# Patient Record
Sex: FEMALE | Race: WHITE
[De-identification: names, ages, dates, MRNs, and addresses within clinical notes are randomized per-mention and may not be internally consistent; named-entity substitution may affect disease eponyms.]

---

## 2018-09-07 ENCOUNTER — HOSPITAL ENCOUNTER (OUTPATIENT)
Dept: HOSPITAL 92 - BICMAMMO | Age: 58
Discharge: HOME | End: 2018-09-07
Attending: INTERNAL MEDICINE
Payer: COMMERCIAL

## 2018-09-07 DIAGNOSIS — Z12.31: Primary | ICD-10-CM

## 2018-09-07 DIAGNOSIS — N63.10: ICD-10-CM

## 2018-09-07 DIAGNOSIS — N63.20: ICD-10-CM

## 2018-09-07 DIAGNOSIS — Z80.3: ICD-10-CM

## 2018-09-07 PROCEDURE — 77063 BREAST TOMOSYNTHESIS BI: CPT

## 2018-09-07 PROCEDURE — 77067 SCR MAMMO BI INCL CAD: CPT

## 2019-08-02 ENCOUNTER — HOSPITAL ENCOUNTER (OUTPATIENT)
Dept: HOSPITAL 92 - DTY/OP | Age: 59
Discharge: HOME | End: 2019-08-02
Attending: SURGERY
Payer: COMMERCIAL

## 2019-08-02 DIAGNOSIS — E66.01: Primary | ICD-10-CM

## 2019-08-02 PROCEDURE — 97802 MEDICAL NUTRITION INDIV IN: CPT

## 2019-09-03 ENCOUNTER — HOSPITAL ENCOUNTER (OUTPATIENT)
Dept: HOSPITAL 92 - DTY/OP | Age: 59
Discharge: HOME | End: 2019-09-03
Attending: SURGERY
Payer: COMMERCIAL

## 2019-09-03 DIAGNOSIS — E66.01: Primary | ICD-10-CM

## 2019-09-03 PROCEDURE — 97802 MEDICAL NUTRITION INDIV IN: CPT

## 2019-10-03 ENCOUNTER — HOSPITAL ENCOUNTER (OUTPATIENT)
Dept: HOSPITAL 92 - DTY/OP | Age: 59
Discharge: HOME | End: 2019-10-03
Attending: SURGERY
Payer: COMMERCIAL

## 2019-10-03 DIAGNOSIS — E66.01: Primary | ICD-10-CM

## 2019-10-03 PROCEDURE — 97802 MEDICAL NUTRITION INDIV IN: CPT

## 2019-11-05 ENCOUNTER — HOSPITAL ENCOUNTER (OUTPATIENT)
Dept: HOSPITAL 92 - DTY/OP | Age: 59
Discharge: HOME | End: 2019-11-05
Attending: SURGERY
Payer: COMMERCIAL

## 2019-11-05 DIAGNOSIS — E66.01: Primary | ICD-10-CM

## 2019-11-05 PROCEDURE — 97802 MEDICAL NUTRITION INDIV IN: CPT

## 2019-11-12 ENCOUNTER — HOSPITAL ENCOUNTER (OUTPATIENT)
Dept: HOSPITAL 92 - LABBT | Age: 59
Discharge: HOME | End: 2019-11-12
Attending: SURGERY
Payer: COMMERCIAL

## 2019-11-12 ENCOUNTER — HOSPITAL ENCOUNTER (INPATIENT)
Dept: HOSPITAL 92 - SURG A | Age: 59
LOS: 9 days | Discharge: HOME | DRG: 621 | End: 2019-11-21
Attending: SURGERY | Admitting: SURGERY
Payer: COMMERCIAL

## 2019-11-12 VITALS — BODY MASS INDEX: 37.8 KG/M2

## 2019-11-12 DIAGNOSIS — M19.91: ICD-10-CM

## 2019-11-12 DIAGNOSIS — Z90.89: ICD-10-CM

## 2019-11-12 DIAGNOSIS — Z01.818: Primary | ICD-10-CM

## 2019-11-12 DIAGNOSIS — Z96.652: ICD-10-CM

## 2019-11-12 DIAGNOSIS — E66.01: Primary | ICD-10-CM

## 2019-11-12 DIAGNOSIS — Z88.8: ICD-10-CM

## 2019-11-12 DIAGNOSIS — E66.01: ICD-10-CM

## 2019-11-12 DIAGNOSIS — Z90.710: ICD-10-CM

## 2019-11-12 DIAGNOSIS — I10: ICD-10-CM

## 2019-11-12 DIAGNOSIS — Z96.642: ICD-10-CM

## 2019-11-12 DIAGNOSIS — E78.5: ICD-10-CM

## 2019-11-12 LAB
ALBUMIN SERPL BCG-MCNC: 4.2 G/DL (ref 3.5–5)
ALP SERPL-CCNC: 61 U/L (ref 40–110)
ALT SERPL W P-5'-P-CCNC: 17 U/L (ref 8–55)
ANION GAP SERPL CALC-SCNC: 13 MMOL/L (ref 10–20)
AST SERPL-CCNC: 21 U/L (ref 5–34)
BASOPHILS # BLD AUTO: 0.1 THOU/UL (ref 0–0.2)
BASOPHILS NFR BLD AUTO: 1.1 % (ref 0–1)
BILIRUB SERPL-MCNC: 0.3 MG/DL (ref 0.2–1.2)
BUN SERPL-MCNC: 19 MG/DL (ref 9.8–20.1)
CALCIUM SERPL-MCNC: 10.2 MG/DL (ref 7.8–10.44)
CHLORIDE SERPL-SCNC: 102 MMOL/L (ref 98–107)
CO2 SERPL-SCNC: 27 MMOL/L (ref 22–29)
CREAT CL PREDICTED SERPL C-G-VRATE: 0 ML/MIN (ref 70–130)
EOSINOPHIL # BLD AUTO: 0.3 THOU/UL (ref 0–0.7)
EOSINOPHIL NFR BLD AUTO: 2.8 % (ref 0–10)
GLOBULIN SER CALC-MCNC: 3.3 G/DL (ref 2.4–3.5)
GLUCOSE SERPL-MCNC: 82 MG/DL (ref 70–105)
HGB BLD-MCNC: 13.8 G/DL (ref 12–16)
LYMPHOCYTES # BLD: 4 THOU/UL (ref 1.2–3.4)
LYMPHOCYTES NFR BLD AUTO: 38.7 % (ref 21–51)
MCH RBC QN AUTO: 31.9 PG (ref 27–31)
MCV RBC AUTO: 95 FL (ref 78–98)
MONOCYTES # BLD AUTO: 1 THOU/UL (ref 0.11–0.59)
MONOCYTES NFR BLD AUTO: 9.8 % (ref 0–10)
NEUTROPHILS # BLD AUTO: 4.9 THOU/UL (ref 1.4–6.5)
NEUTROPHILS NFR BLD AUTO: 47.7 % (ref 42–75)
PLATELET # BLD AUTO: 303 THOU/UL (ref 130–400)
POTASSIUM SERPL-SCNC: 3.6 MMOL/L (ref 3.5–5.1)
RBC # BLD AUTO: 4.32 MILL/UL (ref 4.2–5.4)
SODIUM SERPL-SCNC: 138 MMOL/L (ref 136–145)
WBC # BLD AUTO: 10.3 THOU/UL (ref 4.8–10.8)

## 2019-11-12 PROCEDURE — 85025 COMPLETE CBC W/AUTO DIFF WBC: CPT

## 2019-11-12 PROCEDURE — 74241: CPT

## 2019-11-12 PROCEDURE — 83036 HEMOGLOBIN GLYCOSYLATED A1C: CPT

## 2019-11-12 PROCEDURE — 88307 TISSUE EXAM BY PATHOLOGIST: CPT

## 2019-11-12 PROCEDURE — 94760 N-INVAS EAR/PLS OXIMETRY 1: CPT

## 2019-11-12 PROCEDURE — 88312 SPECIAL STAINS GROUP 1: CPT

## 2019-11-12 PROCEDURE — 80053 COMPREHEN METABOLIC PANEL: CPT

## 2019-11-12 PROCEDURE — C9113 INJ PANTOPRAZOLE SODIUM, VIA: HCPCS

## 2019-11-12 PROCEDURE — S0028 INJECTION, FAMOTIDINE, 20 MG: HCPCS

## 2019-11-12 PROCEDURE — 80048 BASIC METABOLIC PNL TOTAL CA: CPT

## 2019-11-12 PROCEDURE — S0020 INJECTION, BUPIVICAINE HYDRO: HCPCS

## 2019-11-12 PROCEDURE — 36415 COLL VENOUS BLD VENIPUNCTURE: CPT

## 2019-11-12 PROCEDURE — 71046 X-RAY EXAM CHEST 2 VIEWS: CPT

## 2019-11-12 NOTE — RAD
2 view chest:

[11/12/2019]



Comparion:12/29/2014

HISTORY: Preoperative patient



FINDINGS: Heart and mediastinal contours are grossly unremarkable. No pneumothorax or pleural fluid. 
No focal consolidation or alveolar edema.



IMPRESSION: No acute findings.



Reported By: Vu Garduno 

Electronically Signed:  11/12/2019 3:10 PM

## 2019-11-20 PROCEDURE — 0DQ44ZZ REPAIR ESOPHAGOGASTRIC JUNCTION, PERCUTANEOUS ENDOSCOPIC APPROACH: ICD-10-PCS | Performed by: SURGERY

## 2019-11-20 PROCEDURE — 0DB64Z3 EXCISION OF STOMACH, PERCUTANEOUS ENDOSCOPIC APPROACH, VERTICAL: ICD-10-PCS | Performed by: SURGERY

## 2019-11-20 PROCEDURE — 0DJ08ZZ INSPECTION OF UPPER INTESTINAL TRACT, VIA NATURAL OR ARTIFICIAL OPENING ENDOSCOPIC: ICD-10-PCS | Performed by: SURGERY

## 2019-11-20 RX ADMIN — POTASSIUM CHLORIDE, DEXTROSE MONOHYDRATE AND SODIUM CHLORIDE SCH MLS: 150; 5; 450 INJECTION, SOLUTION INTRAVENOUS at 15:01

## 2019-11-20 RX ADMIN — POTASSIUM CHLORIDE, DEXTROSE MONOHYDRATE AND SODIUM CHLORIDE SCH MLS: 150; 5; 450 INJECTION, SOLUTION INTRAVENOUS at 21:14

## 2019-11-20 RX ADMIN — CEFAZOLIN SODIUM SCH MLS: 2 SOLUTION INTRAVENOUS at 17:29

## 2019-11-21 VITALS — TEMPERATURE: 98.3 F | SYSTOLIC BLOOD PRESSURE: 101 MMHG | DIASTOLIC BLOOD PRESSURE: 68 MMHG

## 2019-11-21 LAB
ANION GAP SERPL CALC-SCNC: 10 MMOL/L (ref 10–20)
BASOPHILS # BLD AUTO: 0 THOU/UL (ref 0–0.2)
BASOPHILS NFR BLD AUTO: 0.2 % (ref 0–1)
BUN SERPL-MCNC: 9 MG/DL (ref 9.8–20.1)
CALCIUM SERPL-MCNC: 8.5 MG/DL (ref 7.8–10.44)
CHLORIDE SERPL-SCNC: 106 MMOL/L (ref 98–107)
CO2 SERPL-SCNC: 24 MMOL/L (ref 22–29)
CREAT CL PREDICTED SERPL C-G-VRATE: 211 ML/MIN (ref 70–130)
EOSINOPHIL # BLD AUTO: 0 THOU/UL (ref 0–0.7)
EOSINOPHIL NFR BLD AUTO: 0.3 % (ref 0–10)
GLUCOSE SERPL-MCNC: 115 MG/DL (ref 70–105)
HGB BLD-MCNC: 11.2 G/DL (ref 12–16)
LYMPHOCYTES # BLD: 2.8 THOU/UL (ref 1.2–3.4)
LYMPHOCYTES NFR BLD AUTO: 26.7 % (ref 21–51)
MCH RBC QN AUTO: 31.7 PG (ref 27–31)
MCV RBC AUTO: 94.7 FL (ref 78–98)
MONOCYTES # BLD AUTO: 0.9 THOU/UL (ref 0.11–0.59)
MONOCYTES NFR BLD AUTO: 8.5 % (ref 0–10)
NEUTROPHILS # BLD AUTO: 6.8 THOU/UL (ref 1.4–6.5)
NEUTROPHILS NFR BLD AUTO: 64.3 % (ref 42–75)
PLATELET # BLD AUTO: 257 THOU/UL (ref 130–400)
POTASSIUM SERPL-SCNC: 3.6 MMOL/L (ref 3.5–5.1)
RBC # BLD AUTO: 3.52 MILL/UL (ref 4.2–5.4)
SODIUM SERPL-SCNC: 136 MMOL/L (ref 136–145)
WBC # BLD AUTO: 10.5 THOU/UL (ref 4.8–10.8)

## 2019-11-21 RX ADMIN — CEFAZOLIN SODIUM SCH MLS: 2 SOLUTION INTRAVENOUS at 00:32

## 2019-11-21 RX ADMIN — POTASSIUM CHLORIDE, DEXTROSE MONOHYDRATE AND SODIUM CHLORIDE SCH MLS: 150; 5; 450 INJECTION, SOLUTION INTRAVENOUS at 08:59

## 2019-11-21 RX ADMIN — POTASSIUM CHLORIDE, DEXTROSE MONOHYDRATE AND SODIUM CHLORIDE SCH: 150; 5; 450 INJECTION, SOLUTION INTRAVENOUS at 05:12

## 2019-11-21 NOTE — RAD
Exam: 30 mL Gastrografin single contrast modified upper GI



HISTORY: History of gastroplasty



Exposure: 1.4 minutes. 27.815 gray per centimeter square



FINDINGS: 30 cc of Gastrografin had temporary holdup at the level of the proximal gastric cardia and 
distal esophagus. This eventually progressed to the region of the gastric body and antrum. There

was prolonged holdup of contrast in the stomach without emptying into the duodenum. The patient was h
eld upright for approximately 1 hour and a repeat KUB was performed which demonstrated eventual

emptying of contrast from the stomach.



IMPRESSION: No evidence of leak or extravasation along the gastroplasty site. There was transient hol
dup of contrast at the level of the stomach likely related to a postoperative ileus.



Reported By: Jacoby Méndez 

Electronically Signed:  11/21/2019 10:03 AM

## 2019-11-21 NOTE — OP
DATE OF PROCEDURE:  11/20/2019



PREOPERATIVE DIAGNOSIS:  Morbid obesity.



PROCEDURES PERFORMED:  

1. Laparoscopic takedown of Nissen fundoplication.

2. Sleeve gastrectomy.

3. Esophagogastroscopy.



INDICATIONS:  A 59-year-old female, morbidly obese, who has attempted multiple

weight loss programs without success.  She had a previous laparoscopic Nissen

fundoplication about 15-20 years ago. 



FINDINGS:  The hiatus was intact.  There was no recurrence of the hiatal hernia.

There was moderate adhesions.  A 38-Czech bougie was used. 



DESCRIPTION OF PROCEDURE:  After informed consent was obtained, the patient was

taken to the operating room, given general endotracheal anesthesia, and placed in

supine position.  Abdomen was prepped and draped in the usual fashion.  Local

anesthesia infiltrated subcutaneously and deep and a 12-mm incision was performed

approximately 8 inches above the xiphoid slightly to the left.  A Veress needle

inserted.  Drop test performed.  Pneumoperitoneum was created to a volume of 2 L of

carbon dioxide.  Utilizing a bladeless 12-mm trocar and 0-degree laparoscope, direct

visual entry in the abdominal cavity was performed.  Pneumoperitoneum was created to

a pressure of 15 mmHg and the patient placed in steep reverse Trendelenburg

position.  Sujit liver retractor inserted.  Left lobe of the liver retracted

superiorly.  The pylorus was identified.  A 12-mm port placed on the right beneath

it and two 12s placed left subcostal.  First of all, we approached the previous wrap

and I was able to first of all dissect using sharp dissection off the liver, then I

repositioned the liver retractor that I had extra exposure.  Then, I was able to

find where the fundus was attached anteriorly and I was able to divide those

adhesions to unwrap the fundus.  Then, the omentum was taken off the greater

curvature utilizing the LigaSure.  Short gastrics divided with LigaSure and left

crura defined with LigaSure.  The posterior fundus was dissected out.  Then, the

38-Czech bougie was inserted, directed into the stomach and into the antrum.  The

linear 60-mm green load stapler used to divide the antrum to the bougie, gold load

along the bougie, and a series of blues through the angle of His and I elected to

reinforce that upper most area where the fundoplication had been with some

interrupted [id number] of 2-0 silk suture, tied intracorporeally.

Intraoperative endoscopy was performed.  The video endoscope inserted under direct

vision and advanced into the sleeve.  The staple line inspected.  There was no

bleeding.  Staple line then tested.  There was no air leak.  Stomach decompressed.

Scope removed.  The remnant of stomach removed from the abdomen through the left

lateral port site.  The fascia closed with 0 Vicryl suture and the GraNee needle.

Trocars and retractors were removed after hemostasis assured.  Skin closed with

interrupted 4-0 Rapide.  Dermabond applied.  The patient tolerated the procedure

well and transferred to Recovery in good condition.  Sponge and needle count

verified correct x2. 







Job ID:  [id number]

## 2019-12-04 ENCOUNTER — HOSPITAL ENCOUNTER (OUTPATIENT)
Dept: HOSPITAL 92 - ONC/OP | Age: 59
Discharge: HOME | End: 2019-12-04
Attending: SURGERY
Payer: COMMERCIAL

## 2019-12-04 DIAGNOSIS — Z88.1: ICD-10-CM

## 2019-12-04 DIAGNOSIS — E86.0: Primary | ICD-10-CM

## 2019-12-04 DIAGNOSIS — Z88.5: ICD-10-CM

## 2019-12-04 PROCEDURE — 96366 THER/PROPH/DIAG IV INF ADDON: CPT

## 2019-12-04 PROCEDURE — 96365 THER/PROPH/DIAG IV INF INIT: CPT

## 2019-12-04 PROCEDURE — 96361 HYDRATE IV INFUSION ADD-ON: CPT

## 2019-12-04 PROCEDURE — 96375 TX/PRO/DX INJ NEW DRUG ADDON: CPT

## 2019-12-09 ENCOUNTER — HOSPITAL ENCOUNTER (EMERGENCY)
Dept: HOSPITAL 92 - ERS | Age: 59
Discharge: HOME | End: 2019-12-09
Payer: COMMERCIAL

## 2019-12-09 DIAGNOSIS — E78.5: ICD-10-CM

## 2019-12-09 DIAGNOSIS — Z87.891: ICD-10-CM

## 2019-12-09 DIAGNOSIS — E78.00: ICD-10-CM

## 2019-12-09 DIAGNOSIS — I81: Primary | ICD-10-CM

## 2019-12-09 LAB
ALBUMIN SERPL BCG-MCNC: 3.6 G/DL (ref 3.5–5)
ALP SERPL-CCNC: 73 U/L (ref 40–110)
ALT SERPL W P-5'-P-CCNC: 22 U/L (ref 8–55)
ANION GAP SERPL CALC-SCNC: 16 MMOL/L (ref 10–20)
AST SERPL-CCNC: 25 U/L (ref 5–34)
BASOPHILS # BLD AUTO: 0.1 THOU/UL (ref 0–0.2)
BASOPHILS NFR BLD AUTO: 0.5 % (ref 0–1)
BILIRUB SERPL-MCNC: 0.5 MG/DL (ref 0.2–1.2)
BUN SERPL-MCNC: 10 MG/DL (ref 9.8–20.1)
CALCIUM SERPL-MCNC: 10.1 MG/DL (ref 7.8–10.44)
CHLORIDE SERPL-SCNC: 103 MMOL/L (ref 98–107)
CO2 SERPL-SCNC: 24 MMOL/L (ref 22–29)
CREAT CL PREDICTED SERPL C-G-VRATE: 0 ML/MIN (ref 70–130)
EOSINOPHIL # BLD AUTO: 0.2 THOU/UL (ref 0–0.7)
EOSINOPHIL NFR BLD AUTO: 1.9 % (ref 0–10)
GLOBULIN SER CALC-MCNC: 4.3 G/DL (ref 2.4–3.5)
GLUCOSE SERPL-MCNC: 101 MG/DL (ref 70–105)
HGB BLD-MCNC: 12.6 G/DL (ref 12–16)
LYMPHOCYTES # BLD: 2.5 THOU/UL (ref 1.2–3.4)
LYMPHOCYTES NFR BLD AUTO: 22.9 % (ref 21–51)
MCH RBC QN AUTO: 30.5 PG (ref 27–31)
MCV RBC AUTO: 94.3 FL (ref 78–98)
MONOCYTES # BLD AUTO: 1.1 THOU/UL (ref 0.11–0.59)
MONOCYTES NFR BLD AUTO: 9.7 % (ref 0–10)
NEUTROPHILS # BLD AUTO: 7.2 THOU/UL (ref 1.4–6.5)
NEUTROPHILS NFR BLD AUTO: 65 % (ref 42–75)
PLATELET # BLD AUTO: 431 THOU/UL (ref 130–400)
POTASSIUM SERPL-SCNC: 3.8 MMOL/L (ref 3.5–5.1)
RBC # BLD AUTO: 4.14 MILL/UL (ref 4.2–5.4)
SODIUM SERPL-SCNC: 139 MMOL/L (ref 136–145)
WBC # BLD AUTO: 11.1 THOU/UL (ref 4.8–10.8)

## 2019-12-09 PROCEDURE — 96375 TX/PRO/DX INJ NEW DRUG ADDON: CPT

## 2019-12-09 PROCEDURE — 74177 CT ABD & PELVIS W/CONTRAST: CPT

## 2019-12-09 PROCEDURE — 80053 COMPREHEN METABOLIC PANEL: CPT

## 2019-12-09 PROCEDURE — 85025 COMPLETE CBC W/AUTO DIFF WBC: CPT

## 2019-12-09 PROCEDURE — 36415 COLL VENOUS BLD VENIPUNCTURE: CPT

## 2019-12-09 PROCEDURE — 96372 THER/PROPH/DIAG INJ SC/IM: CPT

## 2019-12-09 PROCEDURE — 96365 THER/PROPH/DIAG IV INF INIT: CPT

## 2019-12-09 PROCEDURE — 83690 ASSAY OF LIPASE: CPT

## 2019-12-09 PROCEDURE — 96361 HYDRATE IV INFUSION ADD-ON: CPT

## 2019-12-09 NOTE — CT
CT abdomen and pelvis with IV and oral contrast



HISTORY: Abdominal pain. Recent gastric sleeve bariatric procedure.



FINDINGS: Small amount of oral contrast is apparent within the distal esophagus. Postoperative change
s of the stomach consistent with recent gastric sleeve procedure. No evidence of obstruction or

leak.



Solid organs of the abdomen are intact. No free air or free fluid.



Low density defect is present within the common portal vein and descends into the superior mesenteric
 vein and splenic vein.



There is stranding of the mesenteric fat centrally with reactive appearing lymph nodes in the central
 mesentery. No focal fluid or air collections. No evidence of bowel obstruction or inflammation.

Pancreas is not inflamed.



Degenerative changes of the lumbar spine. Postoperative changes of the hips. Metallic hip prostheses 
predominately obscure the pelvis, including urinary bladder.











IMPRESSION: Portal venous thrombosis.



Postoperative changes of the stomach without evidence of complication.



Reported By: BROCK Guevara 

Electronically Signed:  12/9/2019 10:28 AM

## 2019-12-26 ENCOUNTER — HOSPITAL ENCOUNTER (OUTPATIENT)
Dept: HOSPITAL 92 - BICMAMMO | Age: 59
Discharge: HOME | End: 2019-12-26
Attending: INTERNAL MEDICINE
Payer: COMMERCIAL

## 2019-12-26 DIAGNOSIS — Z12.31: Primary | ICD-10-CM

## 2019-12-26 PROCEDURE — 77063 BREAST TOMOSYNTHESIS BI: CPT

## 2019-12-26 PROCEDURE — 77067 SCR MAMMO BI INCL CAD: CPT

## 2019-12-26 NOTE — MMO
Bilateral MAMMO Bilat Screen [facility].

 

CLINICAL HISTORY:

Patient is 59 years old and is seen for screening. The patient has no family

history of breast cancer.  The patient has no personal history of cancer.

 

VIEWS:

The views performed were:  bilateral craniocaudal with tomosynthesis and

bilateral mediolateral oblique with tomosynthesis.

 

FILMS COMPARED:

The present examination has been compared to a prior imaging study performed at

[facility] on 09/07/2018.

 

This study has been interpreted with the assistance of computer-aided detection.

 

MAMMOGRAM FINDINGS:

There are scattered fibroglandular densities.

 

There are no suspicious masses, suspicious calcifications, or new areas of

architectural distortion.

 

IMPRESSION:

THERE IS NO MAMMOGRAPHIC EVIDENCE OF MALIGNANCY.

 

A ROUTINE FOLLOW-UP MAMMOGRAM IN 1 YEAR IS RECOMMENDED.

 

THE RESULTS OF THIS EXAM WERE SENT TO THE PATIENT.

 

ACR BI-RADS Category 1 - Negative

 

MAMMOGRAPHY NOTE:

 1. A negative mammogram report should not delay a biopsy if a dominant of

 clinically suspicious mass is present.

 2. Approximately 10% to 15% of breast cancers are not detected by

 mammography.

 3. Adenosis and dense breasts may obscure an underlying neoplasm.

 

 

Reported by: JEAN-CLAUDE SUAZO MD

Electonically Signed: [id number]

## 2020-12-28 ENCOUNTER — HOSPITAL ENCOUNTER (OUTPATIENT)
Dept: HOSPITAL 92 - BICMAMMO | Age: 60
Discharge: HOME | End: 2020-12-28
Attending: INTERNAL MEDICINE
Payer: COMMERCIAL

## 2020-12-28 DIAGNOSIS — Z80.3: ICD-10-CM

## 2020-12-28 DIAGNOSIS — Z12.31: Primary | ICD-10-CM

## 2020-12-28 PROCEDURE — 77067 SCR MAMMO BI INCL CAD: CPT

## 2020-12-28 PROCEDURE — 77063 BREAST TOMOSYNTHESIS BI: CPT

## 2020-12-28 NOTE — MMO
Bilateral MAMMO Bilat Screen [facility].

 

CLINICAL HISTORY:

Patient is 60 years old and is seen for screening. The patient has the following

family history of breast cancer:  maternal aunt, malignant (generic).  The

patient has no personal history of cancer. The patient has a history of right



 

VIEWS:

The views performed were:  bilateral craniocaudal with tomosynthesis and

bilateral mediolateral oblique with tomosynthesis.

 

FILMS COMPARED:

The present examination has been compared to prior imaging studies performed at

[facility] on 09/07/2018 and 12/26/2019.

 

This study has been interpreted with the assistance of computer-aided detection.

 

MAMMOGRAM FINDINGS:

There are scattered fibroglandular densities.

 

There are no suspicious masses, suspicious calcifications, or new areas of

architectural distortion.

 

IMPRESSION:

THERE IS NO MAMMOGRAPHIC EVIDENCE OF MALIGNANCY.

 

A ROUTINE FOLLOW-UP MAMMOGRAM IN 1 YEAR IS RECOMMENDED.

 

THE RESULTS OF THIS EXAM WERE SENT TO THE PATIENT.

 

ACR BI-RADS Category 1 - Negative

 

MAMMOGRAPHY NOTE:

 1. A negative mammogram report should not delay a biopsy if a dominant of

 clinically suspicious mass is present.

 2. Approximately 10% to 15% of breast cancers are not detected by

 mammography.

 3. Adenosis and dense breasts may obscure an underlying neoplasm.

 

 

Reported by: SATHISH VILLARREAL MD

Electonically Signed: [id number]

## 2021-02-15 ENCOUNTER — HOSPITAL ENCOUNTER (EMERGENCY)
Dept: HOSPITAL 92 - ERS | Age: 61
End: 2021-02-15
Payer: COMMERCIAL

## 2021-02-15 DIAGNOSIS — Z87.891: ICD-10-CM

## 2021-02-15 DIAGNOSIS — S93.402A: ICD-10-CM

## 2021-02-15 DIAGNOSIS — S82.001A: Primary | ICD-10-CM
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
## 2021-02-15 DIAGNOSIS — Z79.899: ICD-10-CM

## 2021-02-15 DIAGNOSIS — E78.5: ICD-10-CM

## 2021-02-15 NOTE — CT
CT RIGHT KNEE WITHOUT CONTRAST:

 

HISTORY:

Pain after a fall.

 

COMPARISON:

None.

 

FINDINGS:

There is a large joint effusion.

 

There is a sagittally oriented nondisplaced fracture of the lateral patella, adjacent to an ossificat
ion of the lateral patellar facet of the lateral retinaculum.

 

Large tricompartment osteophytes with a few central articular surface osteophytes of all three compar
tments.

 

The tibial spines are intact.

 

The distal femur, proximal tibia and proximal fibula are without a displaced fracture. Muscle bulk is
 maintained.

 

IMPRESSION:

Nondisplaced sagittally oriented fracture without patellar facet adjacent to a large osteophyte and o
ssification of the lateral retinaculum. This is best seen on axial images 55-61.

 

POS: HOME

## 2021-02-15 NOTE — RAD
RIGHT KNEE FOUR VIEWS:

 

History: Fall

 

Comparison: None

 

FINDINGS: 

There is a large likely hemarthrosis. No acute displaced fracture is appreciated. 

 

Large patellofemoral compartment osteophytes. The body within the suprapatellar recess. 

 

IMPRESSION: 

1. Large likely hemarthrosis, likely sequelae of a nondisplaced impaction type fracture. No displaced
 fracture appreciated. 

2. Severe patellofemoral moderate medial and lateral compartment degenerative change.

 

POS: HOME

## 2021-02-15 NOTE — RAD
LEFT ANKLE THREE VIEWS:

 

History: Fall with injury and pain

 

FINDINGS: 

Mild soft tissue swelling at the ankle. There are degenerative changes at the ankle. No acute fractur
e identified. Prominent enthesophyte from a plantar calcaneus and degenerative changes visualized at 
the intertarsal joints. 

 

IMPRESSION: 

No acute fracture identified. 

 

POS: [facility]

## 2022-05-06 ENCOUNTER — HOSPITAL ENCOUNTER (INPATIENT)
Dept: HOSPITAL 92 - SURG A | Age: 62
LOS: 11 days | Discharge: HOME | DRG: 468 | End: 2022-05-17
Attending: ORTHOPAEDIC SURGERY | Admitting: ORTHOPAEDIC SURGERY
Payer: COMMERCIAL

## 2022-05-06 ENCOUNTER — HOSPITAL ENCOUNTER (OUTPATIENT)
Dept: HOSPITAL 92 - LABBT | Age: 62
Discharge: HOME | End: 2022-05-06
Attending: ORTHOPAEDIC SURGERY
Payer: COMMERCIAL

## 2022-05-06 VITALS — BODY MASS INDEX: 28.2 KG/M2

## 2022-05-06 DIAGNOSIS — Z01.812: Primary | ICD-10-CM

## 2022-05-06 DIAGNOSIS — Z87.891: ICD-10-CM

## 2022-05-06 DIAGNOSIS — E78.00: ICD-10-CM

## 2022-05-06 DIAGNOSIS — Z23: ICD-10-CM

## 2022-05-06 DIAGNOSIS — Z20.822: ICD-10-CM
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
## 2022-05-06 DIAGNOSIS — E78.5: ICD-10-CM

## 2022-05-06 DIAGNOSIS — Z88.5: ICD-10-CM

## 2022-05-06 DIAGNOSIS — Z79.899: ICD-10-CM

## 2022-05-06 DIAGNOSIS — M19.90: ICD-10-CM

## 2022-05-06 DIAGNOSIS — T84.53XA: Primary | ICD-10-CM

## 2022-05-06 DIAGNOSIS — Z98.84: ICD-10-CM

## 2022-05-06 DIAGNOSIS — Z88.1: ICD-10-CM

## 2022-05-06 DIAGNOSIS — T84.53XA: ICD-10-CM

## 2022-05-06 LAB
ANION GAP SERPL CALC-SCNC: 15 MMOL/L (ref 10–20)
BASOPHILS # BLD AUTO: 0.1 10X3/UL (ref 0–0.2)
BASOPHILS NFR BLD AUTO: 0.7 % (ref 0–2)
BUN SERPL-MCNC: 13 MG/DL (ref 9.8–20.1)
CALCIUM SERPL-MCNC: 9 MG/DL (ref 7.8–10.44)
CHLORIDE SERPL-SCNC: 104 MMOL/L (ref 98–107)
CO2 SERPL-SCNC: 23 MMOL/L (ref 22–29)
CREAT CL PREDICTED SERPL C-G-VRATE: 0 ML/MIN (ref 70–130)
CRP SERPL-MCNC: 10.04 MG/DL
EOSINOPHIL # BLD AUTO: 0.1 10X3/UL (ref 0–0.5)
EOSINOPHIL NFR BLD AUTO: 1.7 % (ref 0–6)
GLUCOSE SERPL-MCNC: 91 MG/DL (ref 70–105)
HGB BLD-MCNC: 11.1 G/DL (ref 12–15.5)
LYMPHOCYTES NFR BLD AUTO: 39.9 % (ref 18–47)
MCH RBC QN AUTO: 28 PG (ref 27–33)
MCV RBC AUTO: 89.6 FL (ref 81.6–98.3)
MONOCYTES # BLD AUTO: 0.6 10X3/UL (ref 0–1.1)
MONOCYTES NFR BLD AUTO: 8.9 % (ref 0–10)
NEUTROPHILS # BLD AUTO: 3.5 10X3/UL (ref 1.5–8.4)
NEUTROPHILS NFR BLD AUTO: 48.5 % (ref 40–75)
PLATELET # BLD AUTO: 457 10X3/UL (ref 150–450)
POTASSIUM SERPL-SCNC: 4.5 MMOL/L (ref 3.5–5.1)
RBC # BLD AUTO: 3.96 10X6/UL (ref 3.9–5.03)
SODIUM SERPL-SCNC: 137 MMOL/L (ref 136–145)
WBC # BLD AUTO: 7.2 10X3/UL (ref 3.5–10.5)

## 2022-05-06 PROCEDURE — 80048 BASIC METABOLIC PNL TOTAL CA: CPT

## 2022-05-06 PROCEDURE — 87205 SMEAR GRAM STAIN: CPT

## 2022-05-06 PROCEDURE — 85025 COMPLETE CBC W/AUTO DIFF WBC: CPT

## 2022-05-06 PROCEDURE — 86140 C-REACTIVE PROTEIN: CPT

## 2022-05-06 PROCEDURE — 36415 COLL VENOUS BLD VENIPUNCTURE: CPT

## 2022-05-06 PROCEDURE — 85027 COMPLETE CBC AUTOMATED: CPT

## 2022-05-06 PROCEDURE — C1751 CATH, INF, PER/CENT/MIDLINE: HCPCS

## 2022-05-06 PROCEDURE — C1713 ANCHOR/SCREW BN/BN,TIS/BN: HCPCS

## 2022-05-06 PROCEDURE — U0003 INFECTIOUS AGENT DETECTION BY NUCLEIC ACID (DNA OR RNA); SEVERE ACUTE RESPIRATORY SYNDROME CORONAVIRUS 2 (SARS-COV-2) (CORONAVIRUS DISEASE [COVID-19]), AMPLIFIED PROBE TECHNIQUE, MAKING USE OF HIGH THROUGHPUT TECHNOLOGIES AS DESCRIBED BY CMS-2020-01-R: HCPCS

## 2022-05-06 PROCEDURE — U0005 INFEC AGEN DETEC AMPLI PROBE: HCPCS

## 2022-05-06 PROCEDURE — 36569 INSJ PICC 5 YR+ W/O IMAGING: CPT

## 2022-05-06 PROCEDURE — 82550 ASSAY OF CK (CPK): CPT

## 2022-05-06 PROCEDURE — 87070 CULTURE OTHR SPECIMN AEROBIC: CPT

## 2022-05-06 PROCEDURE — 88305 TISSUE EXAM BY PATHOLOGIST: CPT

## 2022-05-06 PROCEDURE — S0020 INJECTION, BUPIVICAINE HYDRO: HCPCS

## 2022-05-10 PROCEDURE — 0SRC0J9 REPLACEMENT OF RIGHT KNEE JOINT WITH SYNTHETIC SUBSTITUTE, CEMENTED, OPEN APPROACH: ICD-10-PCS | Performed by: ORTHOPAEDIC SURGERY

## 2022-05-10 PROCEDURE — 0SPC0JZ REMOVAL OF SYNTHETIC SUBSTITUTE FROM RIGHT KNEE JOINT, OPEN APPROACH: ICD-10-PCS | Performed by: ORTHOPAEDIC SURGERY

## 2022-05-10 RX ADMIN — DAPTOMYCIN SCH MLS: 500 INJECTION, POWDER, LYOPHILIZED, FOR SOLUTION INTRAVENOUS at 18:38

## 2022-05-10 RX ADMIN — DOCUSATE SODIUM 50 MG AND SENNOSIDES 8.6 MG SCH: 8.6; 5 TABLET, FILM COATED ORAL at 21:02

## 2022-05-10 RX ADMIN — ASPIRIN SCH MG: 81 TABLET ORAL at 21:01

## 2022-05-11 LAB
HGB BLD-MCNC: 10.9 G/DL (ref 12–16)
MCH RBC QN AUTO: 29.4 PG (ref 27–31)
MCV RBC AUTO: 92 FL (ref 78–98)
PLATELET # BLD AUTO: 437 THOU/UL (ref 130–400)
RBC # BLD AUTO: 3.72 MILL/UL (ref 4.2–5.4)
WBC # BLD AUTO: 9.1 THOU/UL (ref 4.8–10.8)

## 2022-05-11 PROCEDURE — 02HV33Z INSERTION OF INFUSION DEVICE INTO SUPERIOR VENA CAVA, PERCUTANEOUS APPROACH: ICD-10-PCS

## 2022-05-11 PROCEDURE — B548ZZA ULTRASONOGRAPHY OF SUPERIOR VENA CAVA, GUIDANCE: ICD-10-PCS

## 2022-05-11 RX ADMIN — CALCIUM CARBONATE SCH MG: 500 TABLET, CHEWABLE ORAL at 08:04

## 2022-05-11 RX ADMIN — ASPIRIN SCH: 81 TABLET ORAL at 13:53

## 2022-05-11 RX ADMIN — DOCUSATE SODIUM 50 MG AND SENNOSIDES 8.6 MG SCH TAB: 8.6; 5 TABLET, FILM COATED ORAL at 08:04

## 2022-05-11 RX ADMIN — THERA TABS SCH TAB: TAB at 21:18

## 2022-05-11 RX ADMIN — THERA TABS SCH TAB: TAB at 08:04

## 2022-05-11 RX ADMIN — DAPTOMYCIN SCH MLS: 500 INJECTION, POWDER, LYOPHILIZED, FOR SOLUTION INTRAVENOUS at 17:50

## 2022-05-11 RX ADMIN — DOCUSATE SODIUM 50 MG AND SENNOSIDES 8.6 MG SCH TAB: 8.6; 5 TABLET, FILM COATED ORAL at 21:18

## 2022-05-11 RX ADMIN — ASPIRIN SCH MG: 81 TABLET ORAL at 21:17

## 2022-05-12 RX ADMIN — DAPTOMYCIN SCH MLS: 500 INJECTION, POWDER, LYOPHILIZED, FOR SOLUTION INTRAVENOUS at 17:51

## 2022-05-12 RX ADMIN — CALCIUM CARBONATE SCH: 500 TABLET, CHEWABLE ORAL at 09:38

## 2022-05-12 RX ADMIN — DOCUSATE SODIUM 50 MG AND SENNOSIDES 8.6 MG SCH TAB: 8.6; 5 TABLET, FILM COATED ORAL at 09:34

## 2022-05-12 RX ADMIN — ASPIRIN SCH MG: 81 TABLET ORAL at 20:10

## 2022-05-12 RX ADMIN — THERA TABS SCH TAB: TAB at 20:11

## 2022-05-12 RX ADMIN — ASPIRIN SCH MG: 81 TABLET ORAL at 09:35

## 2022-05-12 RX ADMIN — THERA TABS SCH TAB: TAB at 09:35

## 2022-05-12 RX ADMIN — DOCUSATE SODIUM 50 MG AND SENNOSIDES 8.6 MG SCH TAB: 8.6; 5 TABLET, FILM COATED ORAL at 20:11

## 2022-05-13 RX ADMIN — DOCUSATE SODIUM 50 MG AND SENNOSIDES 8.6 MG SCH: 8.6; 5 TABLET, FILM COATED ORAL at 21:38

## 2022-05-13 RX ADMIN — DAPTOMYCIN SCH MLS: 500 INJECTION, POWDER, LYOPHILIZED, FOR SOLUTION INTRAVENOUS at 17:50

## 2022-05-13 RX ADMIN — DOCUSATE SODIUM 50 MG AND SENNOSIDES 8.6 MG SCH TAB: 8.6; 5 TABLET, FILM COATED ORAL at 08:41

## 2022-05-13 RX ADMIN — THERA TABS SCH TAB: TAB at 08:42

## 2022-05-13 RX ADMIN — THERA TABS SCH TAB: TAB at 21:25

## 2022-05-13 RX ADMIN — CALCIUM CARBONATE SCH: 500 TABLET, CHEWABLE ORAL at 08:42

## 2022-05-13 RX ADMIN — ASPIRIN SCH MG: 81 TABLET ORAL at 21:25

## 2022-05-13 RX ADMIN — ASPIRIN SCH MG: 81 TABLET ORAL at 08:41

## 2022-05-14 RX ADMIN — ASPIRIN SCH MG: 81 TABLET ORAL at 20:46

## 2022-05-14 RX ADMIN — HYDROCODONE BITARTRATE AND ACETAMINOPHEN PRN TAB: 10; 325 TABLET ORAL at 20:47

## 2022-05-14 RX ADMIN — THERA TABS SCH TAB: TAB at 08:02

## 2022-05-14 RX ADMIN — THERA TABS SCH TAB: TAB at 20:48

## 2022-05-14 RX ADMIN — CALCIUM CARBONATE SCH: 500 TABLET, CHEWABLE ORAL at 08:02

## 2022-05-14 RX ADMIN — ASPIRIN SCH MG: 81 TABLET ORAL at 08:02

## 2022-05-14 RX ADMIN — DOCUSATE SODIUM 50 MG AND SENNOSIDES 8.6 MG SCH TAB: 8.6; 5 TABLET, FILM COATED ORAL at 08:02

## 2022-05-14 RX ADMIN — DAPTOMYCIN SCH MLS: 500 INJECTION, POWDER, LYOPHILIZED, FOR SOLUTION INTRAVENOUS at 18:07

## 2022-05-14 RX ADMIN — DOCUSATE SODIUM 50 MG AND SENNOSIDES 8.6 MG SCH: 8.6; 5 TABLET, FILM COATED ORAL at 20:48

## 2022-05-15 RX ADMIN — THERA TABS SCH TAB: TAB at 21:07

## 2022-05-15 RX ADMIN — HYDROCODONE BITARTRATE AND ACETAMINOPHEN PRN TAB: 10; 325 TABLET ORAL at 21:07

## 2022-05-15 RX ADMIN — DOCUSATE SODIUM 50 MG AND SENNOSIDES 8.6 MG SCH: 8.6; 5 TABLET, FILM COATED ORAL at 08:20

## 2022-05-15 RX ADMIN — CALCIUM CARBONATE SCH: 500 TABLET, CHEWABLE ORAL at 08:19

## 2022-05-15 RX ADMIN — ASPIRIN SCH MG: 81 TABLET ORAL at 21:06

## 2022-05-15 RX ADMIN — ASPIRIN SCH MG: 81 TABLET ORAL at 08:19

## 2022-05-15 RX ADMIN — DAPTOMYCIN SCH MLS: 500 INJECTION, POWDER, LYOPHILIZED, FOR SOLUTION INTRAVENOUS at 17:55

## 2022-05-15 RX ADMIN — DOCUSATE SODIUM 50 MG AND SENNOSIDES 8.6 MG SCH: 8.6; 5 TABLET, FILM COATED ORAL at 21:07

## 2022-05-15 RX ADMIN — THERA TABS SCH TAB: TAB at 08:19

## 2022-05-16 RX ADMIN — THERA TABS SCH TAB: TAB at 21:07

## 2022-05-16 RX ADMIN — ASPIRIN SCH MG: 81 TABLET ORAL at 21:07

## 2022-05-16 RX ADMIN — DOCUSATE SODIUM 50 MG AND SENNOSIDES 8.6 MG SCH: 8.6; 5 TABLET, FILM COATED ORAL at 21:08

## 2022-05-16 RX ADMIN — DAPTOMYCIN SCH MLS: 500 INJECTION, POWDER, LYOPHILIZED, FOR SOLUTION INTRAVENOUS at 19:30

## 2022-05-16 RX ADMIN — HYDROCODONE BITARTRATE AND ACETAMINOPHEN PRN TAB: 10; 325 TABLET ORAL at 17:23

## 2022-05-16 RX ADMIN — DOCUSATE SODIUM 50 MG AND SENNOSIDES 8.6 MG SCH TAB: 8.6; 5 TABLET, FILM COATED ORAL at 10:03

## 2022-05-16 RX ADMIN — HYDROCODONE BITARTRATE AND ACETAMINOPHEN PRN TAB: 10; 325 TABLET ORAL at 21:08

## 2022-05-16 RX ADMIN — THERA TABS SCH TAB: TAB at 10:04

## 2022-05-16 RX ADMIN — CALCIUM CARBONATE SCH: 500 TABLET, CHEWABLE ORAL at 10:02

## 2022-05-16 RX ADMIN — ASPIRIN SCH MG: 81 TABLET ORAL at 10:04

## 2022-05-17 VITALS — TEMPERATURE: 98 F | DIASTOLIC BLOOD PRESSURE: 86 MMHG | SYSTOLIC BLOOD PRESSURE: 128 MMHG

## 2022-05-17 RX ADMIN — THERA TABS SCH TAB: TAB at 07:53

## 2022-05-17 RX ADMIN — CALCIUM CARBONATE SCH: 500 TABLET, CHEWABLE ORAL at 07:58

## 2022-05-17 RX ADMIN — ASPIRIN SCH MG: 81 TABLET ORAL at 07:53

## 2022-05-17 RX ADMIN — DOCUSATE SODIUM 50 MG AND SENNOSIDES 8.6 MG SCH: 8.6; 5 TABLET, FILM COATED ORAL at 07:58

## 2023-01-11 ENCOUNTER — HOSPITAL ENCOUNTER (OUTPATIENT)
Dept: HOSPITAL 92 - BICRAD | Age: 63
Discharge: HOME | End: 2023-01-11
Attending: NURSE PRACTITIONER
Payer: COMMERCIAL

## 2023-01-11 DIAGNOSIS — M25.50: Primary | ICD-10-CM

## 2023-06-20 ENCOUNTER — HOSPITAL ENCOUNTER (OUTPATIENT)
Dept: HOSPITAL 92 - SCSMRI | Age: 63
Discharge: HOME | End: 2023-06-20
Attending: ORTHOPAEDIC SURGERY
Payer: COMMERCIAL

## 2023-06-20 DIAGNOSIS — S43.431A: ICD-10-CM

## 2023-06-20 DIAGNOSIS — M19.011: ICD-10-CM

## 2023-06-20 DIAGNOSIS — S46.011A: Primary | ICD-10-CM

## 2023-06-20 DIAGNOSIS — M75.21: ICD-10-CM

## 2023-08-07 ENCOUNTER — HOSPITAL ENCOUNTER (OUTPATIENT)
Dept: HOSPITAL 92 - LABBT | Age: 63
Discharge: HOME | End: 2023-08-07
Attending: ORTHOPAEDIC SURGERY
Payer: COMMERCIAL

## 2023-08-07 DIAGNOSIS — M19.011: ICD-10-CM

## 2023-08-07 DIAGNOSIS — Z01.818: Primary | ICD-10-CM

## 2023-08-07 LAB
ANION GAP SERPL CALC-SCNC: 16 MMOL/L (ref 10–20)
BASOPHILS # BLD AUTO: 0 10X3/UL (ref 0–0.2)
BASOPHILS NFR BLD AUTO: 0.6 % (ref 0–2)
BUN SERPL-MCNC: 17 MG/DL (ref 9.8–20.1)
CALCIUM SERPL-MCNC: 9.7 MG/DL (ref 7.8–10.44)
CHLORIDE SERPL-SCNC: 109 MMOL/L (ref 98–107)
CO2 SERPL-SCNC: 19 MMOL/L (ref 23–31)
CREAT CL PREDICTED SERPL C-G-VRATE: 0 ML/MIN (ref 70–130)
EOSINOPHIL # BLD AUTO: 0.2 10X3/UL (ref 0–0.5)
EOSINOPHIL NFR BLD AUTO: 2.2 % (ref 0–6)
GLUCOSE SERPL-MCNC: 96 MG/DL (ref 80–115)
HCT VFR BLD CALC: 40.9 % (ref 34.9–44.5)
HGB BLD-MCNC: 13.6 G/DL (ref 12–15.5)
LYMPHOCYTES NFR BLD AUTO: 41 % (ref 18–47)
MCH RBC QN AUTO: 31.6 PG (ref 27–33)
MCV RBC AUTO: 95.1 FL (ref 81.6–98.3)
MONOCYTES # BLD AUTO: 0.6 10X3/UL (ref 0–1.1)
MONOCYTES NFR BLD AUTO: 8.8 % (ref 0–10)
NEUTROPHILS # BLD AUTO: 3.4 10X3/UL (ref 1.5–8.4)
NEUTROPHILS NFR BLD AUTO: 47.1 % (ref 40–75)
PLATELET # BLD AUTO: 318 10X3/UL (ref 150–450)
POTASSIUM SERPL-SCNC: 4.4 MMOL/L (ref 3.5–5.1)
RBC # BLD AUTO: 4.3 10X6/UL (ref 3.9–5.03)
SODIUM SERPL-SCNC: 140 MMOL/L (ref 136–145)
WBC # BLD AUTO: 7.2 10X3/UL (ref 3.5–10.5)

## 2023-08-07 PROCEDURE — 93005 ELECTROCARDIOGRAM TRACING: CPT

## 2023-08-07 PROCEDURE — 93010 ELECTROCARDIOGRAM REPORT: CPT

## 2023-08-07 PROCEDURE — 85025 COMPLETE CBC W/AUTO DIFF WBC: CPT

## 2023-08-07 PROCEDURE — 80048 BASIC METABOLIC PNL TOTAL CA: CPT

## 2023-08-10 ENCOUNTER — HOSPITAL ENCOUNTER (OUTPATIENT)
Dept: HOSPITAL 92 - SDC | Age: 63
Setting detail: OBSERVATION
LOS: 1 days | Discharge: HOME | End: 2023-08-11
Attending: ORTHOPAEDIC SURGERY | Admitting: ORTHOPAEDIC SURGERY
Payer: COMMERCIAL

## 2023-08-10 VITALS — BODY MASS INDEX: 29.5 KG/M2

## 2023-08-10 DIAGNOSIS — Z90.710: ICD-10-CM

## 2023-08-10 DIAGNOSIS — Z96.651: ICD-10-CM

## 2023-08-10 DIAGNOSIS — Z88.6: ICD-10-CM

## 2023-08-10 DIAGNOSIS — M12.811: Primary | ICD-10-CM

## 2023-08-10 DIAGNOSIS — Z88.1: ICD-10-CM

## 2023-08-10 DIAGNOSIS — Z87.891: ICD-10-CM

## 2023-08-10 PROCEDURE — C1776 JOINT DEVICE (IMPLANTABLE): HCPCS

## 2023-08-10 PROCEDURE — S0020 INJECTION, BUPIVICAINE HYDRO: HCPCS

## 2023-08-10 PROCEDURE — C1713 ANCHOR/SCREW BN/BN,TIS/BN: HCPCS

## 2023-08-10 PROCEDURE — 0RRJ00Z REPLACEMENT OF RIGHT SHOULDER JOINT WITH REVERSE BALL AND SOCKET SYNTHETIC SUBSTITUTE, OPEN APPROACH: ICD-10-PCS | Performed by: ORTHOPAEDIC SURGERY

## 2023-08-10 PROCEDURE — A4306 DRUG DELIVERY SYSTEM <=50 ML: HCPCS

## 2023-08-10 RX ADMIN — HYDROCODONE BITARTRATE AND ACETAMINOPHEN PRN TAB: 10; 325 TABLET ORAL at 15:29

## 2023-08-10 RX ADMIN — MULTIPLE VITAMINS W/ MINERALS TAB SCH: TAB at 09:00

## 2023-08-10 RX ADMIN — CALCIUM CARBONATE SCH: 500 TABLET, CHEWABLE ORAL at 09:00

## 2023-08-10 RX ADMIN — CALCIUM CARBONATE SCH: 500 TABLET, CHEWABLE ORAL at 21:19

## 2023-08-11 VITALS — DIASTOLIC BLOOD PRESSURE: 66 MMHG | SYSTOLIC BLOOD PRESSURE: 101 MMHG

## 2023-08-11 VITALS — TEMPERATURE: 98.1 F

## 2023-08-11 RX ADMIN — HYDROCODONE BITARTRATE AND ACETAMINOPHEN PRN TAB: 10; 325 TABLET ORAL at 03:57

## 2023-08-11 RX ADMIN — CALCIUM CARBONATE SCH MG: 500 TABLET, CHEWABLE ORAL at 08:02

## 2023-08-11 RX ADMIN — MULTIPLE VITAMINS W/ MINERALS TAB SCH TAB: TAB at 08:03

## 2023-08-11 RX ADMIN — HYDROCODONE BITARTRATE AND ACETAMINOPHEN PRN TAB: 10; 325 TABLET ORAL at 02:37

## 2023-08-30 ENCOUNTER — HOSPITAL ENCOUNTER (OUTPATIENT)
Dept: HOSPITAL 92 - CSHMAMMO | Age: 63
Discharge: HOME | End: 2023-08-30
Attending: INTERNAL MEDICINE
Payer: COMMERCIAL

## 2023-08-30 DIAGNOSIS — Z53.9: Primary | ICD-10-CM

## 2023-12-05 ENCOUNTER — HOSPITAL ENCOUNTER (OUTPATIENT)
Dept: HOSPITAL 92 - CSHCT | Age: 63
Discharge: HOME | End: 2023-12-05
Attending: OTOLARYNGOLOGY
Payer: COMMERCIAL

## 2023-12-05 DIAGNOSIS — J38.3: ICD-10-CM

## 2023-12-05 DIAGNOSIS — J38.00: ICD-10-CM

## 2023-12-05 DIAGNOSIS — R43.1: Primary | ICD-10-CM

## 2023-12-05 DIAGNOSIS — J98.4: ICD-10-CM

## 2023-12-05 LAB — EGFRCR SERPLBLD CKD-EPI 2021: 73 ML/MIN/{1.73_M2}

## 2023-12-05 PROCEDURE — 82565 ASSAY OF CREATININE: CPT

## 2023-12-05 PROCEDURE — 71260 CT THORAX DX C+: CPT

## 2023-12-05 PROCEDURE — 70553 MRI BRAIN STEM W/O & W/DYE: CPT

## 2023-12-05 PROCEDURE — 70491 CT SOFT TISSUE NECK W/DYE: CPT
